# Patient Record
Sex: MALE | Race: BLACK OR AFRICAN AMERICAN
[De-identification: names, ages, dates, MRNs, and addresses within clinical notes are randomized per-mention and may not be internally consistent; named-entity substitution may affect disease eponyms.]

---

## 2017-09-28 ENCOUNTER — HOSPITAL ENCOUNTER (OUTPATIENT)
Dept: HOSPITAL 92 - CT | Age: 42
Discharge: HOME | End: 2017-09-28
Attending: FAMILY MEDICINE
Payer: COMMERCIAL

## 2017-09-28 DIAGNOSIS — M47.26: Primary | ICD-10-CM

## 2017-09-28 DIAGNOSIS — M54.5: ICD-10-CM

## 2017-09-28 PROCEDURE — 72131 CT LUMBAR SPINE W/O DYE: CPT

## 2017-09-28 NOTE — CT
EXAM:

NONCONTRAST LUMBAR SPINE CT:

 

COMPARISON: 

3/26/15, 6/26/15.

 

TECHNIQUE: 

A noncontrast lumbar spine CT is performed in the axial plane.  Reformatted images are submitted for
 interpretation.

 

FINDINGS: 

Stable 4 mm of retrolisthesis of L5 upon S1.  No associated spondylolysis.  Lumbar spine vertebral b
alexis height is maintained.  There is a fracture.  

 

Hypodensity in the left kidney is redemonstrated and too small to characterize.  

 

No retroperitoneal mass, lymphadenopathy, or hematoma.  Visualized alimentary canal is unremarkable.
  The urinary bladder is unremarkable.  Symmetric attenuation of the psoas muscles.

 

Coronal reformatted images demonstrate appropriate alignment of the lumbar spine.  There is interval
 irregularity involving the inferior right end plate at L4 likely due to a small Schmorl's node.  Sc
lerosis is identified.  

 

Limited evaluation of the contents of the central spinal canal and neural foramina due to technique.

 

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
marzena pedicles.

 

T11-T12 and T12-L1:  No high-grade central canal stenosis.  Neural foramen are patent. 

 

L1-L2:  No high-grade central canal stenosis.  Neural foramen are patent.

 

L2-L3:  No high-grade central canal stenosis.  Neural foramina are patent.

 

L3-L4:  Generalized disk bulge, ligamentum flavum thickening, and facet hypertrophy result in mild t
o moderate central canal stenosis.  Mild to moderate bilateral foraminal narrowing.

 

L4-L5:  Generalized disk bulge, ligamentum flavum thickening, and facet hypertrophy result in modera
te central canal stenosis.  Moderate to severe bilateral foraminal narrowing.

 

L5-S1:  Generalized disk bulge results in mild central canal stenosis.  Moderate bilateral foraminal
 narrowing.

 

IMPRESSION: 

Degenerative changes of the lumbar spine as detailed above.

 

POS: [facility]

## 2017-10-08 ENCOUNTER — HOSPITAL ENCOUNTER (EMERGENCY)
Dept: HOSPITAL 92 - SCSER | Age: 42
Discharge: HOME | End: 2017-10-08
Payer: COMMERCIAL

## 2017-10-08 DIAGNOSIS — M10.9: Primary | ICD-10-CM

## 2017-10-08 DIAGNOSIS — I10: ICD-10-CM

## 2017-10-08 DIAGNOSIS — Z79.899: ICD-10-CM

## 2017-10-08 DIAGNOSIS — Z86.73: ICD-10-CM

## 2017-10-08 LAB
ANION GAP SERPL CALC-SCNC: 15 MMOL/L (ref 10–20)
BUN SERPL-MCNC: 16 MG/DL (ref 8.9–20.6)
CALCIUM SERPL-MCNC: 9.6 MG/DL (ref 7.8–10.44)
CHLORIDE SERPL-SCNC: 101 MMOL/L (ref 98–107)
CO2 SERPL-SCNC: 27 MMOL/L (ref 22–29)
CREAT CL PREDICTED SERPL C-G-VRATE: 0 ML/MIN (ref 70–130)
URATE SERPL-MCNC: 10.7 MG/DL (ref 3.5–7.2)

## 2017-10-08 PROCEDURE — 84550 ASSAY OF BLOOD/URIC ACID: CPT

## 2017-10-08 PROCEDURE — 80048 BASIC METABOLIC PNL TOTAL CA: CPT

## 2017-10-08 PROCEDURE — 96372 THER/PROPH/DIAG INJ SC/IM: CPT

## 2017-10-25 ENCOUNTER — HOSPITAL ENCOUNTER (EMERGENCY)
Dept: HOSPITAL 92 - SCSER | Age: 42
Discharge: HOME | End: 2017-10-25
Payer: COMMERCIAL

## 2017-10-25 DIAGNOSIS — M10.9: Primary | ICD-10-CM

## 2017-10-25 DIAGNOSIS — I48.91: ICD-10-CM

## 2017-10-25 DIAGNOSIS — Z79.899: ICD-10-CM

## 2017-10-25 DIAGNOSIS — Z86.73: ICD-10-CM

## 2017-10-25 DIAGNOSIS — I10: ICD-10-CM

## 2017-10-25 LAB
GLUCOSE SNV-MCNC: 44 MG/DL
NUMBER CELLS COUNTED-FLUIDS: 100
PROT SNV-MCNC: 4.9 G/DL
RBC # FLD AUTO: (no result) /CUMM
URATE SNV-MCNC: 10.1 MG/DL

## 2017-10-25 PROCEDURE — 87070 CULTURE OTHR SPECIMN AEROBIC: CPT

## 2017-10-25 PROCEDURE — 87205 SMEAR GRAM STAIN: CPT

## 2017-10-25 PROCEDURE — 89051 BODY FLUID CELL COUNT: CPT

## 2017-10-25 PROCEDURE — 20610 DRAIN/INJ JOINT/BURSA W/O US: CPT

## 2017-10-25 PROCEDURE — 89060 EXAM SYNOVIAL FLUID CRYSTALS: CPT

## 2017-10-25 PROCEDURE — 82945 GLUCOSE OTHER FLUID: CPT

## 2017-10-25 PROCEDURE — 85060 BLOOD SMEAR INTERPRETATION: CPT

## 2017-10-25 PROCEDURE — 84560 ASSAY OF URINE/URIC ACID: CPT

## 2017-10-25 PROCEDURE — 84157 ASSAY OF PROTEIN OTHER: CPT

## 2018-04-20 ENCOUNTER — HOSPITAL ENCOUNTER (EMERGENCY)
Dept: HOSPITAL 92 - SCSER | Age: 43
Discharge: HOME | End: 2018-04-20
Payer: COMMERCIAL

## 2018-04-20 DIAGNOSIS — L02.414: Primary | ICD-10-CM

## 2018-04-20 DIAGNOSIS — Z79.899: ICD-10-CM

## 2018-04-20 DIAGNOSIS — I10: ICD-10-CM

## 2018-04-20 PROCEDURE — 10060 I&D ABSCESS SIMPLE/SINGLE: CPT

## 2018-04-24 ENCOUNTER — HOSPITAL ENCOUNTER (EMERGENCY)
Dept: HOSPITAL 92 - SCSER | Age: 43
Discharge: HOME | End: 2018-04-24
Payer: COMMERCIAL

## 2018-04-24 DIAGNOSIS — M10.9: Primary | ICD-10-CM

## 2018-04-24 DIAGNOSIS — Z86.73: ICD-10-CM

## 2018-04-24 DIAGNOSIS — I48.91: ICD-10-CM

## 2018-04-24 DIAGNOSIS — I10: ICD-10-CM

## 2018-04-24 DIAGNOSIS — Z79.899: ICD-10-CM

## 2018-04-24 DIAGNOSIS — M19.90: ICD-10-CM

## 2018-04-24 PROCEDURE — 96372 THER/PROPH/DIAG INJ SC/IM: CPT

## 2018-08-27 ENCOUNTER — HOSPITAL ENCOUNTER (EMERGENCY)
Dept: HOSPITAL 92 - SCSER | Age: 43
Discharge: HOME | End: 2018-08-27
Payer: COMMERCIAL

## 2018-08-27 DIAGNOSIS — M79.671: Primary | ICD-10-CM

## 2018-08-27 DIAGNOSIS — M10.9: ICD-10-CM

## 2018-08-27 DIAGNOSIS — Z86.73: ICD-10-CM

## 2018-08-27 DIAGNOSIS — Z79.899: ICD-10-CM

## 2018-08-27 DIAGNOSIS — I48.91: ICD-10-CM

## 2018-08-27 DIAGNOSIS — I10: ICD-10-CM

## 2018-08-27 PROCEDURE — 86376 MICROSOMAL ANTIBODY EACH: CPT

## 2018-08-27 PROCEDURE — 86160 COMPLEMENT ANTIGEN: CPT

## 2018-08-27 PROCEDURE — 84550 ASSAY OF BLOOD/URIC ACID: CPT

## 2018-08-27 PROCEDURE — 83520 IMMUNOASSAY QUANT NOS NONAB: CPT

## 2018-08-27 PROCEDURE — 86225 DNA ANTIBODY NATIVE: CPT

## 2018-08-27 PROCEDURE — 86200 CCP ANTIBODY: CPT

## 2018-08-27 PROCEDURE — 85025 COMPLETE CBC W/AUTO DIFF WBC: CPT

## 2018-08-27 PROCEDURE — 36415 COLL VENOUS BLD VENIPUNCTURE: CPT

## 2018-08-27 PROCEDURE — 86140 C-REACTIVE PROTEIN: CPT

## 2018-08-27 PROCEDURE — 86038 ANTINUCLEAR ANTIBODIES: CPT

## 2018-08-27 PROCEDURE — 83516 IMMUNOASSAY NONANTIBODY: CPT

## 2018-08-27 PROCEDURE — 80053 COMPREHEN METABOLIC PANEL: CPT

## 2018-08-27 NOTE — RAD
3 VIEWS RIGHT FOOT:

 

Date:  08/27/18 

 

INDICATION:

History of right foot pain. 

 

COMPARISON:  

None. 

 

FINDINGS:

No acute fracture or subluxation is evident. There is mild degenerative change involving the great to
e MTP joint. This is suggestive of healed erosive change overlying the great toe metatarsal head, whi
ch may reflect sequelae of prior questioned arthropathy such as gout. There is some mild to moderate 
sized bunion overlying the great toe metatarsal head. There is mild degenerative change involving the
 mid foot. 

 

IMPRESSION: 

1.  No definite acute osseous abnormality. 

 

2.  Healed erosive change along the medial aspect of the great toe MTP joint can be seen with inflamm
atory arthropathy or crystal-induced arthropathies. Recommend correlation for any symptoms and/or sig
ns of gout. 

 

3.  Mild to moderate size bunion overlying great toe metatarsal head. 

 

4.  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 

 

 

POS: [facility]

## 2018-08-27 NOTE — RAD
THREE VIEWS OF THE RIGHT ANKLE:

 

INDICATION: 

History of right ankle pain without known injury.

 

FINDINGS: 

No acute fracture or subluxation is noted.  Enthesopathic changes were seen in the posterior calcaneu
s.

 

IMPRESSION: 

No acute abnormality.

 

POS: [facility]

## 2018-10-29 ENCOUNTER — HOSPITAL ENCOUNTER (EMERGENCY)
Dept: HOSPITAL 92 - SCSER | Age: 43
Discharge: HOME | End: 2018-10-29
Payer: COMMERCIAL

## 2018-10-29 DIAGNOSIS — M10.9: ICD-10-CM

## 2018-10-29 DIAGNOSIS — M54.6: Primary | ICD-10-CM

## 2018-10-29 DIAGNOSIS — Z79.899: ICD-10-CM

## 2018-10-29 DIAGNOSIS — I10: ICD-10-CM

## 2018-10-29 DIAGNOSIS — R00.2: ICD-10-CM

## 2018-10-29 DIAGNOSIS — I48.91: ICD-10-CM

## 2018-10-29 DIAGNOSIS — Z86.73: ICD-10-CM

## 2018-10-29 LAB
ALBUMIN SERPL BCG-MCNC: 4.3 G/DL (ref 3.5–5)
ALP SERPL-CCNC: 89 U/L (ref 40–150)
ALT SERPL W P-5'-P-CCNC: 38 U/L (ref 8–55)
ANION GAP SERPL CALC-SCNC: 13 MMOL/L (ref 10–20)
ANISOCYTOSIS BLD QL SMEAR: (no result) (100X)
AST SERPL-CCNC: 47 U/L (ref 5–34)
BASOPHILS # BLD AUTO: 0.1 THOU/UL (ref 0–0.2)
BASOPHILS NFR BLD AUTO: 1.2 % (ref 0–1)
BILIRUB SERPL-MCNC: 0.3 MG/DL (ref 0.2–1.2)
BUN SERPL-MCNC: 14 MG/DL (ref 8.9–20.6)
CALCIUM SERPL-MCNC: 9.5 MG/DL (ref 7.8–10.44)
CHLORIDE SERPL-SCNC: 103 MMOL/L (ref 98–107)
CK MB SERPL-MCNC: 2 NG/ML (ref 0–6.6)
CK SERPL-CCNC: 1919 U/L (ref 30–200)
CO2 SERPL-SCNC: 26 MMOL/L (ref 22–29)
CREAT CL PREDICTED SERPL C-G-VRATE: 0 ML/MIN (ref 70–130)
EOSINOPHIL # BLD AUTO: 0.3 THOU/UL (ref 0–0.7)
EOSINOPHIL NFR BLD AUTO: 2.2 % (ref 0–10)
GLOBULIN SER CALC-MCNC: 3.4 G/DL (ref 2.4–3.5)
GLUCOSE SERPL-MCNC: 118 MG/DL (ref 70–105)
HGB BLD-MCNC: 15 G/DL (ref 14–18)
LYMPHOCYTES # BLD: 3.5 THOU/UL (ref 1.2–3.4)
LYMPHOCYTES NFR BLD AUTO: 30 % (ref 21–51)
MCH RBC QN AUTO: 24.6 PG (ref 27–31)
MCV RBC AUTO: 79.8 FL (ref 78–98)
MDIFF COMPLETE?: YES
MONOCYTES # BLD AUTO: 0.8 THOU/UL (ref 0.11–0.59)
MONOCYTES NFR BLD AUTO: 6.3 % (ref 0–10)
NEUTROPHILS # BLD AUTO: 7.1 THOU/UL (ref 1.4–6.5)
NEUTROPHILS NFR BLD AUTO: 60.4 % (ref 42–75)
PLATELET # BLD AUTO: 207 THOU/UL (ref 130–400)
PLATELET BLD QL SMEAR: (no result)
POTASSIUM SERPL-SCNC: 3.9 MMOL/L (ref 3.5–5.1)
RBC # BLD AUTO: 6.1 MILL/UL (ref 4.7–6.1)
SODIUM SERPL-SCNC: 138 MMOL/L (ref 136–145)
TROPONIN I SERPL DL<=0.01 NG/ML-MCNC: (no result) NG/ML (ref ?–0.03)
WBC # BLD AUTO: 11.8 THOU/UL (ref 4.8–10.8)

## 2018-10-29 PROCEDURE — 94760 N-INVAS EAR/PLS OXIMETRY 1: CPT

## 2018-10-29 PROCEDURE — 71045 X-RAY EXAM CHEST 1 VIEW: CPT

## 2018-10-29 PROCEDURE — 96374 THER/PROPH/DIAG INJ IV PUSH: CPT

## 2018-10-29 PROCEDURE — 96375 TX/PRO/DX INJ NEW DRUG ADDON: CPT

## 2018-10-29 PROCEDURE — 96361 HYDRATE IV INFUSION ADD-ON: CPT

## 2018-10-29 PROCEDURE — 85025 COMPLETE CBC W/AUTO DIFF WBC: CPT

## 2018-10-29 PROCEDURE — 93005 ELECTROCARDIOGRAM TRACING: CPT

## 2018-10-29 PROCEDURE — 85652 RBC SED RATE AUTOMATED: CPT

## 2018-10-29 PROCEDURE — 80053 COMPREHEN METABOLIC PANEL: CPT

## 2018-10-29 PROCEDURE — 82553 CREATINE MB FRACTION: CPT

## 2018-10-29 PROCEDURE — 84484 ASSAY OF TROPONIN QUANT: CPT

## 2018-10-29 NOTE — RAD
CHEST ONE VIEW:

 

HISTORY:

Pain.

 

COMPARISON:

05/22/2017

 

FINDINGS:

Stable left-sided transvenous defibrillator.  Normal cardiac silhouette.  Pulmonary vessels and hilum
 are normal.  Costophrenic angles are clear.  No consolidation or mass.  No pneumothorax or osseous a
bnormalities.

 

IMPRESSION:

No acute cardiopulmonary process.

 

POS: [facility]

## 2019-10-12 ENCOUNTER — HOSPITAL ENCOUNTER (EMERGENCY)
Dept: HOSPITAL 92 - SCSER | Age: 44
Discharge: HOME | End: 2019-10-12
Payer: COMMERCIAL

## 2019-10-12 DIAGNOSIS — I10: ICD-10-CM

## 2019-10-12 DIAGNOSIS — Z79.899: ICD-10-CM

## 2019-10-12 DIAGNOSIS — M19.90: ICD-10-CM

## 2019-10-12 DIAGNOSIS — Z79.82: ICD-10-CM

## 2019-10-12 DIAGNOSIS — M10.9: ICD-10-CM

## 2019-10-12 DIAGNOSIS — M06.9: ICD-10-CM
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
## 2019-10-12 DIAGNOSIS — I48.91: ICD-10-CM

## 2019-10-12 DIAGNOSIS — Z86.73: ICD-10-CM

## 2019-10-12 DIAGNOSIS — S49.91XA: Primary | ICD-10-CM

## 2019-10-12 NOTE — RAD
Right shoulder 3 views



HISTORY: Shoulder pain.



FINDINGS: No acute fracture or dislocation are apparent. Mild osteophytosis. Slight lateral and infer
ior subluxation of the humeral head in relation to the glenoid with subtle surrounding soft tissue

density around the humeral head. No aggressive osseous erosions.











IMPRESSION: Probable joint effusion. Cause is not evident.



No acute osseous abnormalities are demonstrated.



Reported By: MARY Toth 

Electronically Signed:  10/12/2019 8:17 AM

## 2020-02-17 ENCOUNTER — HOSPITAL ENCOUNTER (OUTPATIENT)
Dept: HOSPITAL 92 - RAD | Age: 45
Discharge: HOME | End: 2020-02-17
Attending: PHYSICIAN ASSISTANT
Payer: COMMERCIAL

## 2020-02-17 VITALS — BODY MASS INDEX: 35.9 KG/M2

## 2020-02-17 VITALS — TEMPERATURE: 97.4 F | DIASTOLIC BLOOD PRESSURE: 99 MMHG | SYSTOLIC BLOOD PRESSURE: 143 MMHG

## 2020-02-17 DIAGNOSIS — Z95.810: ICD-10-CM

## 2020-02-17 DIAGNOSIS — I10: ICD-10-CM

## 2020-02-17 DIAGNOSIS — I48.91: ICD-10-CM

## 2020-02-17 DIAGNOSIS — M19.90: ICD-10-CM

## 2020-02-17 DIAGNOSIS — M48.061: ICD-10-CM

## 2020-02-17 DIAGNOSIS — Z86.73: ICD-10-CM

## 2020-02-17 DIAGNOSIS — M51.16: Primary | ICD-10-CM

## 2020-02-17 DIAGNOSIS — Z79.899: ICD-10-CM

## 2020-02-17 DIAGNOSIS — M10.9: ICD-10-CM

## 2020-02-17 PROCEDURE — B02B1ZZ COMPUTERIZED TOMOGRAPHY (CT SCAN) OF SPINAL CORD USING LOW OSMOLAR CONTRAST: ICD-10-PCS | Performed by: RADIOLOGY

## 2020-02-17 PROCEDURE — 72132 CT LUMBAR SPINE W/DYE: CPT

## 2020-02-17 PROCEDURE — 62304 MYELOGRAPHY LUMBAR INJECTION: CPT

## 2020-02-17 NOTE — RAD
EXAM:

XR Myelogram Lumbar Spine



PROVIDED CLINICAL HISTORY:

Lumbar radiculopathy, spinal stenosis, low back pain with pain in bilateral lower extremities.



COMPARISON:

None



TECHNIQUE:

After informed consent was obtained, the patient was placed on the fluoroscopy table in the prone pos
ition. Area overlying the L2-3 interspace was marked, and the area was meticulously prepped and

draped in usual sterile fashion. The skin and subcutaneous tissues were infiltrated with buffered 1% 
lidocaine for local anesthesia.



Utilizing intermittent fluoroscopic guidance, a 22-gauge spinal needle was advanced into the thecal s
ac at the L2-3 level. The inner stylette was removed, and there was a return of clear cerebral

spinal fluid. As a result, approximately 8 mL of Isovue-M 200 was instilled into the thecal sac. The 
inner stylette was replaced, and the needle was removed. Hemostasis was achieved with direct

pressure. Dry sterile dressing was placed at puncture site.



The patient tolerated the procedure well and without immediate complication. Patient was transported 
to CT scanner for further imaging of the lumbar spine.



Fluoroscopy: 

Total fluoroscopy time-0.7 seconds

Total dose-127.9 microGy meter squared



FINDINGS:  AP and lateral image lumbar spine are obtained. The vertebral body heights are within
 normal limits. There is no significant narrowing of the intervertebral disc spaces. There is

slight retrolisthesis of L3 on L4. No fracture is seen. Views of the lumbar spine are overall similar
 to study on 8/25/2017.



IMPRESSION:

Technically successful lumbar myelogram. Please see CT lumbar spine performed after this examination 
for further details.



Reported By: Santiago Goldman 

Electronically Signed:  2/17/2020 10:56 AM
Single Mechanical/Accidental Fall

## 2020-02-17 NOTE — CT
CT lumbar spine without  contrast:



HISTORY:

Low back pain and bilateral lower leg pain. Lumbar radiculopathy.



COMPARISON:

CT lumbar spine postmyelogram obtained on 6/26/2015.



FINDINGS:

A hypodense superior pole left renal lesion is again seen. This measures approximately 1.8 cm with pr
evious measurement of 1.4 cm. This does demonstrate fluid attenuation on noncontrast imaging

suggesting a superior pole left renal cyst. This was also present on CT lumbar spinal on 9/20/2017. M
inimal vascular calcifications are seen in the abdominal aorta.



No fracture or subluxation is seen involving the lumbar spine



L1-2: No high-grade central canal narrowing is present. The neural foramina are patent.



L2-3: No high-grade central canal narrowing is present. The neural foramina are patent.



L3-4: There is slight retrolisthesis of L3 on L4. A mild disc osteophyte complex present. Mild facet 
hypertrophic changes are present. Mild to moderate bilateral neural foraminal narrowing is present

and stable from prior exam.



L4-5: A disc osteophyte complex is present at this level and similar to prior exam. Mild facet hypert
rophic changes are present. Moderate central canal narrowing is again noted with moderate to severe

bilateral neural foraminal narrowing persisting as well. Findings are similar to prior exam.



L5-S1: Mild disc osteophyte complex is present with minimal central disc protrusion. This results in 
slight mass effect on the anterior aspect of thecal sac similar to prior exam. Moderate bilateral

neural foraminal narrowing is present. The degree of neural foraminal narrowing is also unchanged com
pared to study in 2015.



IMPRESSION:

Overall stable CT lumbar spine when compared to prior study in 2015 with disc degenerative changes in
 the lower lumbar spine greatest at the L4-5 level. Slight retrolisthesis of L3 on L4 is present.





Reported By: Santiago Goldman 

Electronically Signed:  2/17/2020 10:15 AM

## 2020-05-13 ENCOUNTER — HOSPITAL ENCOUNTER (INPATIENT)
Dept: HOSPITAL 92 - SDC | Age: 45
LOS: 1 days | Discharge: HOME | DRG: 520 | End: 2020-05-14
Attending: NEUROLOGICAL SURGERY | Admitting: NEUROLOGICAL SURGERY
Payer: COMMERCIAL

## 2020-05-13 VITALS — BODY MASS INDEX: 34.4 KG/M2

## 2020-05-13 DIAGNOSIS — G43.909: ICD-10-CM

## 2020-05-13 DIAGNOSIS — M51.16: ICD-10-CM

## 2020-05-13 DIAGNOSIS — I25.10: ICD-10-CM

## 2020-05-13 DIAGNOSIS — M48.062: Primary | ICD-10-CM

## 2020-05-13 DIAGNOSIS — E66.9: ICD-10-CM

## 2020-05-13 DIAGNOSIS — I10: ICD-10-CM

## 2020-05-13 LAB
ANION GAP SERPL CALC-SCNC: 13 MMOL/L (ref 10–20)
APTT PPP: 30.2 SEC (ref 22.9–36.1)
BASOPHILS # BLD AUTO: 0.1 THOU/UL (ref 0–0.2)
BASOPHILS NFR BLD AUTO: 0.8 % (ref 0–1)
BUN SERPL-MCNC: 12 MG/DL (ref 8.9–20.6)
CALCIUM SERPL-MCNC: 9.5 MG/DL (ref 7.8–10.44)
CHLORIDE SERPL-SCNC: 105 MMOL/L (ref 98–107)
CO2 SERPL-SCNC: 25 MMOL/L (ref 22–29)
CREAT CL PREDICTED SERPL C-G-VRATE: 106 ML/MIN (ref 70–130)
EOSINOPHIL # BLD AUTO: 0.4 THOU/UL (ref 0–0.7)
EOSINOPHIL NFR BLD AUTO: 4 % (ref 0–10)
GLUCOSE SERPL-MCNC: 93 MG/DL (ref 70–105)
HGB BLD-MCNC: 15.4 G/DL (ref 14–18)
INR PPP: 0.9
LYMPHOCYTES # BLD: 3.2 THOU/UL (ref 1.2–3.4)
LYMPHOCYTES NFR BLD AUTO: 30.7 % (ref 21–51)
MCH RBC QN AUTO: 25.6 PG (ref 27–31)
MCV RBC AUTO: 81 FL (ref 78–98)
MONOCYTES # BLD AUTO: 0.8 THOU/UL (ref 0.11–0.59)
MONOCYTES NFR BLD AUTO: 7.2 % (ref 0–10)
NEUTROPHILS # BLD AUTO: 6 THOU/UL (ref 1.4–6.5)
NEUTROPHILS NFR BLD AUTO: 57.3 % (ref 42–75)
PLATELET # BLD AUTO: 252 THOU/UL (ref 130–400)
POTASSIUM SERPL-SCNC: 4.7 MMOL/L (ref 3.5–5.1)
PROTHROMBIN TIME: 12.3 SEC (ref 12–14.7)
RBC # BLD AUTO: 6.02 MILL/UL (ref 4.7–6.1)
SODIUM SERPL-SCNC: 138 MMOL/L (ref 136–145)
WBC # BLD AUTO: 10.4 THOU/UL (ref 4.8–10.8)

## 2020-05-13 PROCEDURE — 85730 THROMBOPLASTIN TIME PARTIAL: CPT

## 2020-05-13 PROCEDURE — U0003 INFECTIOUS AGENT DETECTION BY NUCLEIC ACID (DNA OR RNA); SEVERE ACUTE RESPIRATORY SYNDROME CORONAVIRUS 2 (SARS-COV-2) (CORONAVIRUS DISEASE [COVID-19]), AMPLIFIED PROBE TECHNIQUE, MAKING USE OF HIGH THROUGHPUT TECHNOLOGIES AS DESCRIBED BY CMS-2020-01-R: HCPCS

## 2020-05-13 PROCEDURE — 01NB0ZZ RELEASE LUMBAR NERVE, OPEN APPROACH: ICD-10-PCS | Performed by: NEUROLOGICAL SURGERY

## 2020-05-13 PROCEDURE — 85610 PROTHROMBIN TIME: CPT

## 2020-05-13 PROCEDURE — 76000 FLUOROSCOPY <1 HR PHYS/QHP: CPT

## 2020-05-13 PROCEDURE — 36415 COLL VENOUS BLD VENIPUNCTURE: CPT

## 2020-05-13 PROCEDURE — 93005 ELECTROCARDIOGRAM TRACING: CPT

## 2020-05-13 PROCEDURE — 93010 ELECTROCARDIOGRAM REPORT: CPT

## 2020-05-13 PROCEDURE — 85025 COMPLETE CBC W/AUTO DIFF WBC: CPT

## 2020-05-13 PROCEDURE — 80048 BASIC METABOLIC PNL TOTAL CA: CPT

## 2020-05-13 PROCEDURE — 0SB20ZZ EXCISION OF LUMBAR VERTEBRAL DISC, OPEN APPROACH: ICD-10-PCS | Performed by: NEUROLOGICAL SURGERY

## 2020-05-13 PROCEDURE — 87635 SARS-COV-2 COVID-19 AMP PRB: CPT

## 2020-05-13 RX ADMIN — HYDROCODONE BITARTRATE AND ACETAMINOPHEN PRN TAB: 7.5; 325 TABLET ORAL at 20:45

## 2020-05-13 RX ADMIN — CEFAZOLIN SODIUM SCH MLS: 2 SOLUTION INTRAVENOUS at 20:39

## 2020-05-13 NOTE — EKG
Test Reason : PREOP

Blood Pressure : ***/*** mmHG

Vent. Rate : 068 BPM     Atrial Rate : 068 BPM

   P-R Int : 178 ms          QRS Dur : 094 ms

    QT Int : 384 ms       P-R-T Axes : 041 058 023 degrees

   QTc Int : 408 ms

 

Normal sinus rhythm

Normal ECG

When compared with ECG of 29-OCT-2018 17:10,

Premature supraventricular complexes are no longer Present

Confirmed by KIRSTEN RODRIGUEZ, DR. HART ([id number]) on 5/13/2020 9:37:54 PM

 

Referred By:  MARJ           Confirmed By:DR. TANNER CURTIS MD

## 2020-05-14 VITALS — TEMPERATURE: 98.4 F | DIASTOLIC BLOOD PRESSURE: 77 MMHG | SYSTOLIC BLOOD PRESSURE: 122 MMHG

## 2020-05-14 RX ADMIN — HYDROCODONE BITARTRATE AND ACETAMINOPHEN PRN TAB: 7.5; 325 TABLET ORAL at 13:39

## 2020-05-14 RX ADMIN — HYDROCODONE BITARTRATE AND ACETAMINOPHEN PRN TAB: 7.5; 325 TABLET ORAL at 07:54

## 2020-05-14 RX ADMIN — CEFAZOLIN SODIUM SCH MLS: 2 SOLUTION INTRAVENOUS at 04:09

## 2020-05-14 NOTE — PRG
DATE OF SERVICE:  05/14/2020



Mr. Temple is doing well on postoperative day #1 following lumbar decompression.

His leg pain has resolved.  He does report some tip of the tongue numbness likely

just related to medication, specifically anesthesia.  He is already mobilizing with

excellent strength.  He will be discharged. 







Job ID:  [id number]

## 2020-05-15 NOTE — OP
DATE OF PROCEDURE:  05/13/2020



ASSISTANT:  Kinga Lucas PA-C



PREPROCEDURE DIAGNOSES:  Low back and leg pain with lumbar radiculopathy, lumbar

stenosis, and disk extrusion. 



POSTPROCEDURE DIAGNOSES:  Low back and leg pain with lumbar radiculopathy, lumbar

stenosis, and disk extrusion. 



PROCEDURES:  

1. L3-L4, L4-L5 laminectomies, partial facetectomies, foraminotomies.

2. Left L4-L5 diskectomy.

3. Use of operative microscope for microdissection.



DESCRIPTION OF PROCEDURE:  After informed consent was obtained from the patient, the

patient was brought to the OR.  Proper patient, pause, and identification were

carried out.  He was placed under excellent endotracheal anesthesia and positioned

prone on the OR table.  L3 through L5 linear katia was drawn dorsally over the

segments.  This area sterilely cleansed, prepared, and draped.  Proper patient,

pause, and identification were carried out.  The wound was then opened with a

combination of sharp, monopolar, and blunt dissection.  The L3, L4, L5 dorsal spines

and laminae were exposed.  Localization film confirmed our area of interest.  We

then performed L3 through L5 laminectomy, partial facetectomy, and foraminotomy.

The microscope was brought in for microdissection.  The left L4-L5 diskectomy was

performed with excellent decompression of common dural tube and nerve roots.  The

wound was then copiously irrigated, closed in anatomic layers. 







Job ID:  [id number]

## 2022-09-28 ENCOUNTER — HOSPITAL ENCOUNTER (EMERGENCY)
Dept: HOSPITAL 92 - CSHERS | Age: 47
LOS: 1 days | Discharge: HOME | End: 2022-09-29
Payer: COMMERCIAL

## 2022-09-28 DIAGNOSIS — R10.11: Primary | ICD-10-CM

## 2022-09-28 DIAGNOSIS — Z79.899: ICD-10-CM

## 2022-09-28 DIAGNOSIS — Z86.73: ICD-10-CM

## 2022-09-28 DIAGNOSIS — R42: ICD-10-CM

## 2022-09-28 DIAGNOSIS — Z95.0: ICD-10-CM

## 2022-09-28 DIAGNOSIS — I10: ICD-10-CM

## 2022-09-28 DIAGNOSIS — I48.91: ICD-10-CM

## 2022-09-28 LAB
ALBUMIN SERPL BCG-MCNC: 4.1 G/DL (ref 3.5–5)
ALP SERPL-CCNC: 105 U/L (ref 40–110)
ALT SERPL W P-5'-P-CCNC: 24 U/L (ref 8–55)
ANION GAP SERPL CALC-SCNC: 13 MMOL/L (ref 10–20)
AST SERPL-CCNC: 27 U/L (ref 5–34)
BASOPHILS # BLD AUTO: 0.1 10X3/UL (ref 0–0.2)
BASOPHILS NFR BLD AUTO: 0.6 % (ref 0–2)
BILIRUB SERPL-MCNC: 0.4 MG/DL (ref 0.2–1.2)
BUN SERPL-MCNC: 14 MG/DL (ref 8.9–20.6)
CALCIUM SERPL-MCNC: 9.1 MG/DL (ref 7.8–10.44)
CHLORIDE SERPL-SCNC: 102 MMOL/L (ref 98–107)
CO2 SERPL-SCNC: 27 MMOL/L (ref 22–29)
CREAT CL PREDICTED SERPL C-G-VRATE: 0 ML/MIN (ref 70–130)
EOSINOPHIL # BLD AUTO: 0.5 10X3/UL (ref 0–0.5)
EOSINOPHIL NFR BLD AUTO: 5 % (ref 0–6)
GLOBULIN SER CALC-MCNC: 3.4 G/DL (ref 2.4–3.5)
GLUCOSE SERPL-MCNC: 109 MG/DL (ref 70–105)
HGB BLD-MCNC: 14.6 G/DL (ref 13.5–17.5)
LIPASE SERPL-CCNC: 16 U/L (ref 8–78)
LYMPHOCYTES NFR BLD AUTO: 21.8 % (ref 18–47)
MCH RBC QN AUTO: 25 PG (ref 27–33)
MCV RBC AUTO: 77 FL (ref 81.2–95.1)
MONOCYTES # BLD AUTO: 0.6 10X3/UL (ref 0–1.1)
MONOCYTES NFR BLD AUTO: 6 % (ref 0–10)
NEUTROPHILS # BLD AUTO: 6.9 10X3/UL (ref 1.5–8.4)
NEUTROPHILS NFR BLD AUTO: 66.5 % (ref 40–75)
PLATELET # BLD AUTO: 250 10X3/UL (ref 150–450)
POTASSIUM SERPL-SCNC: 4.2 MMOL/L (ref 3.5–5.1)
RBC # BLD AUTO: 5.83 10X6/UL (ref 4.32–5.72)
SODIUM SERPL-SCNC: 138 MMOL/L (ref 136–145)
WBC # BLD AUTO: 10.4 10X3/UL (ref 3.5–10.5)

## 2022-09-28 PROCEDURE — 76705 ECHO EXAM OF ABDOMEN: CPT

## 2022-09-28 PROCEDURE — 70498 CT ANGIOGRAPHY NECK: CPT

## 2022-09-28 PROCEDURE — 70496 CT ANGIOGRAPHY HEAD: CPT

## 2022-09-28 PROCEDURE — 93005 ELECTROCARDIOGRAM TRACING: CPT

## 2022-09-28 PROCEDURE — 85025 COMPLETE CBC W/AUTO DIFF WBC: CPT

## 2022-09-28 PROCEDURE — 71045 X-RAY EXAM CHEST 1 VIEW: CPT

## 2022-09-28 PROCEDURE — 96374 THER/PROPH/DIAG INJ IV PUSH: CPT

## 2022-09-28 PROCEDURE — 70450 CT HEAD/BRAIN W/O DYE: CPT

## 2022-09-28 PROCEDURE — 84484 ASSAY OF TROPONIN QUANT: CPT

## 2022-09-28 PROCEDURE — 80053 COMPREHEN METABOLIC PANEL: CPT

## 2022-09-28 PROCEDURE — 96375 TX/PRO/DX INJ NEW DRUG ADDON: CPT

## 2022-09-28 PROCEDURE — 83690 ASSAY OF LIPASE: CPT
